# Patient Record
Sex: FEMALE | Race: OTHER | HISPANIC OR LATINO | URBAN - METROPOLITAN AREA
[De-identification: names, ages, dates, MRNs, and addresses within clinical notes are randomized per-mention and may not be internally consistent; named-entity substitution may affect disease eponyms.]

---

## 2020-06-06 ENCOUNTER — INPATIENT (INPATIENT)
Facility: HOSPITAL | Age: 20
LOS: 0 days | Discharge: HOME | End: 2020-06-07
Attending: SPECIALIST | Admitting: SPECIALIST

## 2020-06-06 VITALS — TEMPERATURE: 98 F

## 2020-06-06 DIAGNOSIS — Z3A.00 WEEKS OF GESTATION OF PREGNANCY NOT SPECIFIED: ICD-10-CM

## 2020-06-06 DIAGNOSIS — O26.899 OTHER SPECIFIED PREGNANCY RELATED CONDITIONS, UNSPECIFIED TRIMESTER: ICD-10-CM

## 2020-06-06 LAB
AMPHET UR-MCNC: NEGATIVE — SIGNIFICANT CHANGE UP
BARBITURATES UR SCN-MCNC: NEGATIVE — SIGNIFICANT CHANGE UP
BASOPHILS # BLD AUTO: 0.02 K/UL — SIGNIFICANT CHANGE UP (ref 0–0.2)
BASOPHILS NFR BLD AUTO: 0.2 % — SIGNIFICANT CHANGE UP (ref 0–2)
BENZODIAZ UR-MCNC: NEGATIVE — SIGNIFICANT CHANGE UP
BLD GP AB SCN SERPL QL: NEGATIVE — SIGNIFICANT CHANGE UP
CANNABINOIDS UR-MCNC: NEGATIVE — SIGNIFICANT CHANGE UP
COCAINE METAB.OTHER UR-MCNC: NEGATIVE — SIGNIFICANT CHANGE UP
EOSINOPHIL # BLD AUTO: 0.05 K/UL — SIGNIFICANT CHANGE UP (ref 0–0.5)
EOSINOPHIL NFR BLD AUTO: 0.6 % — SIGNIFICANT CHANGE UP (ref 0–6)
HBV SURFACE AG SER-ACNC: NEGATIVE — SIGNIFICANT CHANGE UP
HCT VFR BLD CALC: 31.4 % — LOW (ref 34.5–45)
HGB BLD-MCNC: 9.9 G/DL — LOW (ref 11.5–15.5)
HIV COMBO RESULT: SIGNIFICANT CHANGE UP
HIV1+2 AB SPEC QL: SIGNIFICANT CHANGE UP
IMM GRANULOCYTES NFR BLD AUTO: 0.9 % — SIGNIFICANT CHANGE UP (ref 0–1.5)
LYMPHOCYTES # BLD AUTO: 1.52 K/UL — SIGNIFICANT CHANGE UP (ref 1–3.3)
LYMPHOCYTES # BLD AUTO: 16.9 % — SIGNIFICANT CHANGE UP (ref 13–44)
MCHC RBC-ENTMCNC: 27 PG — SIGNIFICANT CHANGE UP (ref 27–34)
MCHC RBC-ENTMCNC: 31.5 % — LOW (ref 32–36)
MCV RBC AUTO: 85.8 FL — SIGNIFICANT CHANGE UP (ref 80–100)
METHADONE UR-MCNC: NEGATIVE — SIGNIFICANT CHANGE UP
MONOCYTES # BLD AUTO: 0.58 K/UL — SIGNIFICANT CHANGE UP (ref 0–0.9)
MONOCYTES NFR BLD AUTO: 6.5 % — SIGNIFICANT CHANGE UP (ref 2–14)
NEUTROPHILS # BLD AUTO: 6.74 K/UL — SIGNIFICANT CHANGE UP (ref 1.8–7.4)
NEUTROPHILS NFR BLD AUTO: 74.9 % — SIGNIFICANT CHANGE UP (ref 43–77)
NRBC # FLD: 0 K/UL — SIGNIFICANT CHANGE UP (ref 0–0)
OPIATES UR-MCNC: NEGATIVE — SIGNIFICANT CHANGE UP
OXYCODONE UR-MCNC: NEGATIVE — SIGNIFICANT CHANGE UP
PCP UR-MCNC: NEGATIVE — SIGNIFICANT CHANGE UP
PLATELET # BLD AUTO: 215 K/UL — SIGNIFICANT CHANGE UP (ref 150–400)
PMV BLD: 11.7 FL — SIGNIFICANT CHANGE UP (ref 7–13)
RBC # BLD: 3.66 M/UL — LOW (ref 3.8–5.2)
RBC # FLD: 15.7 % — HIGH (ref 10.3–14.5)
RH IG SCN BLD-IMP: POSITIVE — SIGNIFICANT CHANGE UP
RH IG SCN BLD-IMP: POSITIVE — SIGNIFICANT CHANGE UP
RUBV IGG SER-ACNC: 1.5 INDEX — SIGNIFICANT CHANGE UP
RUBV IGG SER-IMP: POSITIVE — SIGNIFICANT CHANGE UP
SARS-COV-2 RNA SPEC QL NAA+PROBE: SIGNIFICANT CHANGE UP
T PALLIDUM AB TITR SER: NEGATIVE — SIGNIFICANT CHANGE UP
WBC # BLD: 8.99 K/UL — SIGNIFICANT CHANGE UP (ref 3.8–10.5)
WBC # FLD AUTO: 8.99 K/UL — SIGNIFICANT CHANGE UP (ref 3.8–10.5)

## 2020-06-06 RX ORDER — OXYTOCIN 10 UNIT/ML
1 VIAL (ML) INJECTION
Qty: 30 | Refills: 0 | Status: DISCONTINUED | OUTPATIENT
Start: 2020-06-06 | End: 2020-06-07

## 2020-06-06 RX ORDER — MAGNESIUM HYDROXIDE 400 MG/1
30 TABLET, CHEWABLE ORAL
Refills: 0 | Status: DISCONTINUED | OUTPATIENT
Start: 2020-06-06 | End: 2020-06-07

## 2020-06-06 RX ORDER — SODIUM CHLORIDE 9 MG/ML
3 INJECTION INTRAMUSCULAR; INTRAVENOUS; SUBCUTANEOUS EVERY 8 HOURS
Refills: 0 | Status: DISCONTINUED | OUTPATIENT
Start: 2020-06-06 | End: 2020-06-07

## 2020-06-06 RX ORDER — IBUPROFEN 200 MG
600 TABLET ORAL EVERY 6 HOURS
Refills: 0 | Status: COMPLETED | OUTPATIENT
Start: 2020-06-06 | End: 2021-05-05

## 2020-06-06 RX ORDER — DIPHENHYDRAMINE HCL 50 MG
25 CAPSULE ORAL EVERY 6 HOURS
Refills: 0 | Status: DISCONTINUED | OUTPATIENT
Start: 2020-06-06 | End: 2020-06-07

## 2020-06-06 RX ORDER — ACETAMINOPHEN 500 MG
975 TABLET ORAL
Refills: 0 | Status: DISCONTINUED | OUTPATIENT
Start: 2020-06-06 | End: 2020-06-07

## 2020-06-06 RX ORDER — PRAMOXINE HYDROCHLORIDE 150 MG/15G
1 AEROSOL, FOAM RECTAL EVERY 4 HOURS
Refills: 0 | Status: DISCONTINUED | OUTPATIENT
Start: 2020-06-06 | End: 2020-06-07

## 2020-06-06 RX ORDER — OXYCODONE HYDROCHLORIDE 5 MG/1
5 TABLET ORAL
Refills: 0 | Status: DISCONTINUED | OUTPATIENT
Start: 2020-06-06 | End: 2020-06-07

## 2020-06-06 RX ORDER — SIMETHICONE 80 MG/1
80 TABLET, CHEWABLE ORAL EVERY 4 HOURS
Refills: 0 | Status: DISCONTINUED | OUTPATIENT
Start: 2020-06-06 | End: 2020-06-07

## 2020-06-06 RX ORDER — BENZOCAINE 10 %
1 GEL (GRAM) MUCOUS MEMBRANE EVERY 6 HOURS
Refills: 0 | Status: DISCONTINUED | OUTPATIENT
Start: 2020-06-06 | End: 2020-06-07

## 2020-06-06 RX ORDER — OXYCODONE HYDROCHLORIDE 5 MG/1
5 TABLET ORAL ONCE
Refills: 0 | Status: DISCONTINUED | OUTPATIENT
Start: 2020-06-06 | End: 2020-06-07

## 2020-06-06 RX ORDER — SODIUM CHLORIDE 9 MG/ML
1000 INJECTION, SOLUTION INTRAVENOUS
Refills: 0 | Status: DISCONTINUED | OUTPATIENT
Start: 2020-06-06 | End: 2020-06-06

## 2020-06-06 RX ORDER — AER TRAVELER 0.5 G/1
1 SOLUTION RECTAL; TOPICAL EVERY 4 HOURS
Refills: 0 | Status: DISCONTINUED | OUTPATIENT
Start: 2020-06-06 | End: 2020-06-07

## 2020-06-06 RX ORDER — KETOROLAC TROMETHAMINE 30 MG/ML
30 SYRINGE (ML) INJECTION ONCE
Refills: 0 | Status: DISCONTINUED | OUTPATIENT
Start: 2020-06-06 | End: 2020-06-06

## 2020-06-06 RX ORDER — HYDROCORTISONE 1 %
1 OINTMENT (GRAM) TOPICAL EVERY 6 HOURS
Refills: 0 | Status: DISCONTINUED | OUTPATIENT
Start: 2020-06-06 | End: 2020-06-07

## 2020-06-06 RX ORDER — OXYTOCIN 10 UNIT/ML
333.33 VIAL (ML) INJECTION
Qty: 20 | Refills: 0 | Status: DISCONTINUED | OUTPATIENT
Start: 2020-06-06 | End: 2020-06-06

## 2020-06-06 RX ORDER — OXYTOCIN 10 UNIT/ML
333.33 VIAL (ML) INJECTION
Qty: 20 | Refills: 0 | Status: DISCONTINUED | OUTPATIENT
Start: 2020-06-06 | End: 2020-06-07

## 2020-06-06 RX ORDER — CITRIC ACID/SODIUM CITRATE 300-500 MG
15 SOLUTION, ORAL ORAL EVERY 6 HOURS
Refills: 0 | Status: DISCONTINUED | OUTPATIENT
Start: 2020-06-06 | End: 2020-06-06

## 2020-06-06 RX ORDER — IBUPROFEN 200 MG
600 TABLET ORAL EVERY 6 HOURS
Refills: 0 | Status: DISCONTINUED | OUTPATIENT
Start: 2020-06-06 | End: 2020-06-07

## 2020-06-06 RX ORDER — LANOLIN
1 OINTMENT (GRAM) TOPICAL EVERY 6 HOURS
Refills: 0 | Status: DISCONTINUED | OUTPATIENT
Start: 2020-06-06 | End: 2020-06-07

## 2020-06-06 RX ORDER — DIBUCAINE 1 %
1 OINTMENT (GRAM) RECTAL EVERY 6 HOURS
Refills: 0 | Status: DISCONTINUED | OUTPATIENT
Start: 2020-06-06 | End: 2020-06-07

## 2020-06-06 RX ORDER — TETANUS TOXOID, REDUCED DIPHTHERIA TOXOID AND ACELLULAR PERTUSSIS VACCINE, ADSORBED 5; 2.5; 8; 8; 2.5 [IU]/.5ML; [IU]/.5ML; UG/.5ML; UG/.5ML; UG/.5ML
0.5 SUSPENSION INTRAMUSCULAR ONCE
Refills: 0 | Status: DISCONTINUED | OUTPATIENT
Start: 2020-06-06 | End: 2020-06-07

## 2020-06-06 RX ORDER — OXYTOCIN 10 UNIT/ML
2 VIAL (ML) INJECTION
Qty: 30 | Refills: 0 | Status: DISCONTINUED | OUTPATIENT
Start: 2020-06-06 | End: 2020-06-06

## 2020-06-06 RX ADMIN — Medication 1 MILLIUNIT(S)/MIN: at 09:00

## 2020-06-06 RX ADMIN — Medication 600 MILLIGRAM(S): at 22:25

## 2020-06-06 RX ADMIN — SODIUM CHLORIDE 125 MILLILITER(S): 9 INJECTION, SOLUTION INTRAVENOUS at 09:01

## 2020-06-06 RX ADMIN — SODIUM CHLORIDE 3 MILLILITER(S): 9 INJECTION INTRAMUSCULAR; INTRAVENOUS; SUBCUTANEOUS at 22:25

## 2020-06-06 NOTE — OB PROVIDER TRIAGE NOTE - HISTORY OF PRESENT ILLNESS
19 y.o.   at 41.1w presenting with complaints of painful contractions since 0300 today.  Patient reports contractions q2-3 minutes 8/10 pain.  Patient denies any need for pain intervention at this time.  Patient reports positive fetal movement, denies vaginal bleeding, denies leakage of fluid.     a/p course complications denies  patient was expected to [facility] today for Induction of Labor for Post Dates

## 2020-06-06 NOTE — OB PROVIDER TRIAGE NOTE - NSHPPHYSICALEXAM_GEN_ALL_CORE
Vital Signs Last 24 Hrs  T(C): 36.8 (06 Jun 2020 06:03), Max: 36.8 (06 Jun 2020 06:03)  T(F): 98.2 (06 Jun 2020 06:03), Max: 98.2 (06 Jun 2020 06:03)  HR: 73 (06 Jun 2020 06:03) (73 - 73)  BP: 104/62 (06 Jun 2020 06:03) (104/62 - 104/62)  BP(mean): --  RR: 17 (06 Jun 2020 06:03) (17 - 17)  SpO2: --    regular heart rate; rhythm   lungs CTA     abdomen soft nontender gravid  (+) FHR; moderate variability; with accelerations  irregular uterine contractions noted on tocometer    Vaginal Exam: 5/70/-3    Cephalic via admission sonogram

## 2020-06-06 NOTE — OB RN TRIAGE NOTE - ABORTIONS, OB PROFILE
No Pcp    Last eye exam - June 2019    Pt checked bs at home with a reading of 78.    Rooming documentation of social history includes tobacco screening only.       0

## 2020-06-06 NOTE — CHART NOTE - NSCHARTNOTEFT_GEN_A_CORE
R1 OB Progress Note    Patient seen and evaluated at bedside.       T(C): 37 (06-06-20 @ 07:32), Max: 37 (06-06-20 @ 07:32)  HR: 88 (06-06-20 @ 11:40) (54 - 96)  BP: 100/49 (06-06-20 @ 11:39) (86/48 - 158/70)  RR: 16 (06-06-20 @ 07:32) (16 - 17)  SpO2: 100% (06-06-20 @ 11:40) (88% - 100%)    SVE: 9/90/-2  EFM: 125, mod eliu, - accels, recurrent variable decels NAN   [place]:  irregular    A/P 19y P1  admitted for labor  -Labor: CATII tracing. No augmentation at this time  -Fetus: category 2 tracing due variable decels.  Overall reassuring w/ moderate variability.   Continue lateral tilt, O2 as needed for intrauterine resucitation. Ephedrine given after epidural with low BPs. Fluid bolus given  -GBS unk  -Analgesia: epi effective     Bonilla Corea PGY1  d/w

## 2020-06-06 NOTE — OB PROVIDER DELIVERY SUMMARY - NSPROVIDERDELIVERYNOTE_OBGYN_ALL_OB_FT
Spontaneous vaginal delivery of liveborn infant. Head, shoulders, and body delivered easily. Infant was suctioned. Heavy mec. Cord was clamped and cut and infant was passed to peds. Placenta delivered intact with a 3 vessel cord. Fundal massage was given and uterine fundus was found to be firm. Vaginal exam revealed an intact cervix, vaginal walls and sulci, and perineum. Patient was stable and went to recovery. Count was correct x 2. Spontaneous vaginal delivery of liveborn infant. Head delivered, Nuchal x 1 reduced without difficulty followed by shoulders, and body. Infant was suctioned. Heavy mec. Cord was clamped and cut and infant was passed to peds. Placenta delivered intact with a 3 vessel cord. Fundal massage was given and uterine fundus was found to be firm. Vaginal exam revealed an intact cervix, vaginal walls and sulci, and perineum. Patient was stable and went to recovery. Count was correct x 2.

## 2020-06-06 NOTE — OB PROVIDER H&P - HISTORY OF PRESENT ILLNESS
19 y.o.   at 41.1w presenting with complaints of painful contractions since 0300 today.  Patient reports contractions q2-3 minutes 8/10 pain.  Patient denies any need for pain intervention at this time.  Patient reports positive fetal movement, denies vaginal bleeding, denies leakage of fluid.     a/p course complications denies  patient was expected to [facility] today for Induction of Labor for Post Dates

## 2020-06-06 NOTE — OB RN TRIAGE NOTE - CHIEF COMPLAINT QUOTE
"Contractions from 3.30, every 5 minutes now, supposed to be scheduled induction today evening at [place] for post dates"

## 2020-06-06 NOTE — CHART NOTE - NSCHARTNOTEFT_GEN_A_CORE
R1 OB Progress Note    Patient seen and evaluated at bedside.       T(C): 37 (06-06-20 @ 07:32), Max: 37 (06-06-20 @ 07:32)  HR: 77 (06-06-20 @ 10:24) (63 - 89)  BP: 105/53 (06-06-20 @ 07:59) (104/62 - 111/65)  RR: 16 (06-06-20 @ 07:32) (16 - 17)  SpO2: 100% (06-06-20 @ 10:29) (88% - 100%)    SVE: 8/90/-3  EFM: 140, mod eliu, + accels, + Variable decels CATII  [place]:  irregular    A/P 19y P1  admitted for labor  -Labor: currently on 1uPit. Will hold  -Fetus: category 2 tracing due to intermittent variables.  Overall reassuring w/ moderate variability.   Continue lateral tilt, O2 as needed for intrauterine resucitation.    -GBS unk  -Analgesia: pt declines epidural     Bonilla Corea PGY1   bedside

## 2020-06-06 NOTE — OB RN DELIVERY SUMMARY - NS_SEPSISRSKCALC_OBGYN_ALL_OB_FT
EOS calculated successfully. EOS Risk Factor: 0.5/1000 live births ([facility] national incidence); GA=41w1d; Temp=98.96; ROM=2.933; GBS='Unknown'; Antibiotics='No antibiotics or any antibiotics < 2 hrs prior to birth'

## 2020-06-06 NOTE — OB NEONATOLOGY/PEDIATRICIAN DELIVERY SUMMARY - NSPEDSNEONOTESA_OBGYN_ALL_OB_FT
Baby girl born at 41.1 wks via precipitous  to a 18 y/o  O+ blood type mother. No significant maternal or prenatal history known. HepB nonreactive and HIV negative. RPR and Rubella pending. COVID pending. AROM at 1045 with thick meconium fluids (ROM duration 4 hours). Baby emerged vigorous, crying, was w/d/s/s with APGARS of 8/9. Mom would like to breast and bottle feed, consents Hep. EOS 0.13 (highest maternal temp 37degC). NICU NP Conrado Will present at delivery.

## 2020-06-06 NOTE — CHART NOTE - NSCHARTNOTEFT_GEN_A_CORE
R1 OB Progress Note    [facility] Interpretor #[id number]  Patient seen and evaluated at bedside.  Denies complaints.  Currently does not want an epidural    T(C): 37 (06-06-20 @ 07:32), Max: 37 (06-06-20 @ 07:32)  HR: 76 (06-06-20 @ 08:34) (64 - 89)  BP: 105/53 (06-06-20 @ 07:59) (104/62 - 111/65)  RR: 16 (06-06-20 @ 07:32) (16 - 17)  SpO2: 100% (06-06-20 @ 08:39) (91% - 100%)    SVE: 5.5/80/-3  EFM: 145, mod eliu, + accels, - decels NAN   [place]:  q2-4m    A/P 19y P1   admitted for labor  -Labor: Will start pitocin at this time  -Fetus: NAN  -GBS unk  -Analgesia: declines epidural at this time    Bonilla Corea PGY1  d/w

## 2020-06-06 NOTE — OB PROVIDER H&P - ASSESSMENT
pmh: denies  psh: denies  obhx: -FT- ; 8lbs  gynhx: denies    NKDA    Medications  PNV    Assessment  Vital Signs Last 24 Hrs  T(C): 36.8 (2020 06:03), Max: 36.8 (2020 06:03)  T(F): 98.2 (2020 06:03), Max: 98.2 (2020 06:03)  HR: 73 (2020 06:03) (73 - 73)  BP: 104/62 (2020 06:03) (104/62 - 104/62)  BP(mean): --  RR: 17 (2020 06:03) (17 - 17)  SpO2: --    regular heart rate; rhythm   lungs CTA     abdomen soft nontender gravid  (+) FHR; moderate variability; with accelerations  irregular uterine contractions noted on tocometer    Vaginal Exam: 5/70/-3    Cephalic via admission sonogram    GBS (-) per patient     COVID 19 assessment; patient denies signs and symptoms related to COVID 19  reports recent testing; resulted as negative     Plan  Admit to Labor and Delivery for Labor at Term  Routine Admissions Labs   Labs  COVID 19 Swam Pending  Expectant Management    D/w Dr. Reed

## 2020-06-07 VITALS
SYSTOLIC BLOOD PRESSURE: 91 MMHG | TEMPERATURE: 98 F | OXYGEN SATURATION: 96 % | RESPIRATION RATE: 16 BRPM | HEART RATE: 65 BPM | DIASTOLIC BLOOD PRESSURE: 50 MMHG

## 2020-06-07 RX ORDER — ACETAMINOPHEN 500 MG
3 TABLET ORAL
Qty: 0 | Refills: 0 | DISCHARGE
Start: 2020-06-07

## 2020-06-07 RX ORDER — MEDROXYPROGESTERONE ACETATE 150 MG/ML
150 INJECTION, SUSPENSION, EXTENDED RELEASE INTRAMUSCULAR ONCE
Refills: 0 | Status: COMPLETED | OUTPATIENT
Start: 2020-06-07 | End: 2020-06-07

## 2020-06-07 RX ORDER — IBUPROFEN 200 MG
1 TABLET ORAL
Qty: 0 | Refills: 0 | DISCHARGE
Start: 2020-06-07

## 2020-06-07 RX ADMIN — MEDROXYPROGESTERONE ACETATE 150 MILLIGRAM(S): 150 INJECTION, SUSPENSION, EXTENDED RELEASE INTRAMUSCULAR at 15:58

## 2020-06-07 RX ADMIN — Medication 600 MILLIGRAM(S): at 03:32

## 2020-06-07 RX ADMIN — SODIUM CHLORIDE 3 MILLILITER(S): 9 INJECTION INTRAMUSCULAR; INTRAVENOUS; SUBCUTANEOUS at 05:34

## 2020-06-07 RX ADMIN — Medication 600 MILLIGRAM(S): at 15:25

## 2020-06-07 RX ADMIN — Medication 975 MILLIGRAM(S): at 09:59

## 2020-06-07 NOTE — DISCHARGE NOTE OB - PROVIDER TOKENS
FREE:[LAST:[[facility]],PHONE:[phone]],FAX:[(   )    -],FOLLOWUP:[Routine]]

## 2020-06-07 NOTE — DISCHARGE NOTE OB - CARE PROVIDER_API CALL
[facility],   Phone: [phone]  Fax: (   )    -  Follow Up Time: Routine

## 2020-06-07 NOTE — DISCHARGE NOTE OB - CARE PLAN
Principal Discharge DX:	 (normal spontaneous vaginal delivery)  Goal:	Recover  Assessment and plan of treatment:	After discharge, please stay on pelvic rest for 6 weeks, meaning no sexual intercourse, no tampons and no douching.  No driving for 2 weeks as women can loose a lot of blood during delivery and there is a possibility of being lightheaded/fainting.  No lifting objects heavier than baby for two weeks.  Expect to have vaginal bleeding/spotting for up to six weeks.  The bleeding should get lighter and more white/light brown with time.  For bleeding soaking more than a pad an hour or passing clots greater than the size of your fist, come in to the emergency department.    Follow up in clinic in 6 weeks.

## 2020-06-07 NOTE — DISCHARGE NOTE OB - MEDICATION SUMMARY - MEDICATIONS TO TAKE
I will START or STAY ON the medications listed below when I get home from the hospital:    acetaminophen 325 mg oral tablet  -- 3 tab(s) by mouth   -- Indication: For  (normal spontaneous vaginal delivery)    ibuprofen 600 mg oral tablet  -- 1 tab(s) by mouth every 6 hours  -- Indication: For  (normal spontaneous vaginal delivery)

## 2020-06-07 NOTE — PROGRESS NOTE ADULT - PROBLEM SELECTOR PLAN 1
-Depo->nexplanon   - Pain well controlled, continue current pain regimen  - Increase ambulation, SCDs when not ambulating  - Continue regular diet  - Would like to follow up in PCAP    Randolph Sofia PGY1

## 2020-06-07 NOTE — DISCHARGE NOTE OB - PLAN OF CARE
Recover After discharge, please stay on pelvic rest for 6 weeks, meaning no sexual intercourse, no tampons and no douching.  No driving for 2 weeks as women can loose a lot of blood during delivery and there is a possibility of being lightheaded/fainting.  No lifting objects heavier than baby for two weeks.  Expect to have vaginal bleeding/spotting for up to six weeks.  The bleeding should get lighter and more white/light brown with time.  For bleeding soaking more than a pad an hour or passing clots greater than the size of your fist, come in to the emergency department.    Follow up in clinic in 6 weeks.

## 2020-06-07 NOTE — DISCHARGE NOTE OB - HOSPITAL COURSE
Patient had uncomplicated, nonsurgical vaginal delivery.  Please see delivery note for details.  During postpartum course patient's vitals were stable, vaginal bleeding appropriate, and pain well controlled.  On day of discharge patient was ambulating, her pain controlled with oral medications, had adequate oral intake, and was voiding freely.  Discharge instructions and precautions were given.  Will return to clinic in 6 weeks for postpartum visit.  Postpartum birth control plan is depot.

## 2020-06-07 NOTE — PROGRESS NOTE ADULT - ATTENDING COMMENTS
Pt seen agree with above resident note.  PPD#1 no complaints.  VSS, afebrile.  Will dc home today.     Alma Nieto MD

## 2020-06-07 NOTE — PROGRESS NOTE ADULT - SUBJECTIVE AND OBJECTIVE BOX
OB Progress Note:  PPD#1    S: 18yo  PPD#1 s/p . Patient feels well. Pain is well controlled. She is tolerating a regular diet and passing flatus. She is voiding spontaneously, and ambulating without difficulty. Denies CP/SOB. Denies lightheadedness/dizziness. Denies N/V. Denies heavy vaginal bleeding.    O:  Vitals:  Vital Signs Last 24 Hrs  T(C): 36.6 (2020 01:21), Max: 37.2 (2020 13:34)  T(F): 97.9 (2020 01:21), Max: 99 (2020 13:45)  HR: 71 (:21) (54 - 130)  BP: 98/51 (2020 01:) (82/45 - 158/70)  BP(mean): --  RR: 16 (2020 01:21) (15 - 17)  SpO2: 97% (2020 01:) (88% - 100%)    MEDICATIONS  (STANDING):  acetaminophen   Tablet .. 975 milliGRAM(s) Oral <User Schedule>  diphtheria/tetanus/pertussis (acellular) Vaccine (ADAcel) 0.5 milliLiter(s) IntraMuscular once  ibuprofen  Tablet. 600 milliGRAM(s) Oral every 6 hours  oxytocin Infusion 333.333 milliUNIT(s)/Min (1000 mL/Hr) IV Continuous <Continuous>  oxytocin Infusion 1 milliUNIT(s)/Min (1 mL/Hr) IV Continuous <Continuous>  prenatal multivitamin 1 Tablet(s) Oral daily  sodium chloride 0.9% lock flush 3 milliLiter(s) IV Push every 8 hours      Labs:  Blood type: O Positive  Rubella IgG: Positive ( @ 06:35)  RPR: Negative                          9.9<L>   8.99 >-----------< 215    (  @ 06:35 )             31.4<L>    Physical Exam:  General: NAD  Abdomen: soft, non-tender, non-distended, fundus firm  Vaginal: No heavy vaginal bleeding  Extremities: No erythema/edema    A/P: 18yo PPD#1 s/p .  Patient is stable and doing well post-partum.   -Depo->nexplanon   - Pain well controlled, continue current pain regimen  - Increase ambulation, SCDs when not ambulating  - Continue regular diet  - Would like to follow up in PCAP    Randolph Sofia PGY1 OB Progress Note:  PPD#1    S: 18yo  PPD#1 s/p . Patient feels well. Pain is well controlled. She is tolerating a regular diet and passing flatus. She is voiding spontaneously, and ambulating without difficulty. Denies CP/SOB. Denies lightheadedness/dizziness. Denies N/V. Denies heavy vaginal bleeding.    O:  Vitals:  Vital Signs Last 24 Hrs  T(C): 36.6 (2020 01:21), Max: 37.2 (2020 13:34)  T(F): 97.9 (2020 01:21), Max: 99 (2020 13:45)  HR: 71 (:) (54 - 130)  BP: 98/51 (:) (82/45 - 158/70)  BP(mean): --  RR: 16 (2020 01:21) (15 - 17)  SpO2: 97% (:) (88% - 100%)    MEDICATIONS  (STANDING):  acetaminophen   Tablet .. 975 milliGRAM(s) Oral <User Schedule>  diphtheria/tetanus/pertussis (acellular) Vaccine (ADAcel) 0.5 milliLiter(s) IntraMuscular once  ibuprofen  Tablet. 600 milliGRAM(s) Oral every 6 hours  oxytocin Infusion 333.333 milliUNIT(s)/Min (1000 mL/Hr) IV Continuous <Continuous>  oxytocin Infusion 1 milliUNIT(s)/Min (1 mL/Hr) IV Continuous <Continuous>  prenatal multivitamin 1 Tablet(s) Oral daily  sodium chloride 0.9% lock flush 3 milliLiter(s) IV Push every 8 hours      Labs:  Blood type: O Positive  Rubella IgG: Positive ( @ 06:35)  RPR: Negative                          9.9<L>   8.99 >-----------< 215    (  @ 06:35 )             31.4<L>    Physical Exam:  General: NAD  Abdomen: soft, non-tender, non-distended, fundus firm  Vaginal: No heavy vaginal bleeding  Extremities: No erythema/edema

## 2021-08-16 ENCOUNTER — EMERGENCY (EMERGENCY)
Facility: HOSPITAL | Age: 21
LOS: 1 days | Discharge: HOME | End: 2021-08-16
Attending: HOSPITALIST | Admitting: HOSPITALIST
Payer: MEDICAID

## 2021-08-16 VITALS
DIASTOLIC BLOOD PRESSURE: 66 MMHG | HEIGHT: 60 IN | TEMPERATURE: 98 F | HEART RATE: 84 BPM | OXYGEN SATURATION: 100 % | SYSTOLIC BLOOD PRESSURE: 107 MMHG | RESPIRATION RATE: 19 BRPM

## 2021-08-16 LAB
ALBUMIN SERPL ELPH-MCNC: 4.3 G/DL — SIGNIFICANT CHANGE UP (ref 3.3–5)
ALP SERPL-CCNC: 85 U/L — SIGNIFICANT CHANGE UP (ref 40–120)
ALT FLD-CCNC: 14 U/L — SIGNIFICANT CHANGE UP (ref 4–33)
ANION GAP SERPL CALC-SCNC: 14 MMOL/L — SIGNIFICANT CHANGE UP (ref 7–14)
APPEARANCE UR: CLEAR — SIGNIFICANT CHANGE UP
AST SERPL-CCNC: 13 U/L — SIGNIFICANT CHANGE UP (ref 4–32)
BACTERIA # UR AUTO: NEGATIVE — SIGNIFICANT CHANGE UP
BASOPHILS # BLD AUTO: 0.01 K/UL — SIGNIFICANT CHANGE UP (ref 0–0.2)
BASOPHILS NFR BLD AUTO: 0.2 % — SIGNIFICANT CHANGE UP (ref 0–2)
BILIRUB SERPL-MCNC: 0.7 MG/DL — SIGNIFICANT CHANGE UP (ref 0.2–1.2)
BILIRUB UR-MCNC: NEGATIVE — SIGNIFICANT CHANGE UP
BUN SERPL-MCNC: 15 MG/DL — SIGNIFICANT CHANGE UP (ref 7–23)
CALCIUM SERPL-MCNC: 8.8 MG/DL — SIGNIFICANT CHANGE UP (ref 8.4–10.5)
CHLORIDE SERPL-SCNC: 106 MMOL/L — SIGNIFICANT CHANGE UP (ref 98–107)
CO2 SERPL-SCNC: 20 MMOL/L — LOW (ref 22–31)
COLOR SPEC: YELLOW — SIGNIFICANT CHANGE UP
CREAT SERPL-MCNC: 0.49 MG/DL — LOW (ref 0.5–1.3)
DIFF PNL FLD: NEGATIVE — SIGNIFICANT CHANGE UP
EOSINOPHIL # BLD AUTO: 0.03 K/UL — SIGNIFICANT CHANGE UP (ref 0–0.5)
EOSINOPHIL NFR BLD AUTO: 0.5 % — SIGNIFICANT CHANGE UP (ref 0–6)
EPI CELLS # UR: 9 /HPF — HIGH (ref 0–5)
GLUCOSE SERPL-MCNC: 89 MG/DL — SIGNIFICANT CHANGE UP (ref 70–99)
GLUCOSE UR QL: NEGATIVE — SIGNIFICANT CHANGE UP
HCT VFR BLD CALC: 39 % — SIGNIFICANT CHANGE UP (ref 34.5–45)
HGB BLD-MCNC: 12.9 G/DL — SIGNIFICANT CHANGE UP (ref 11.5–15.5)
HYALINE CASTS # UR AUTO: 3 /LPF — SIGNIFICANT CHANGE UP (ref 0–7)
IANC: 5.21 K/UL — SIGNIFICANT CHANGE UP (ref 1.5–8.5)
IMM GRANULOCYTES NFR BLD AUTO: 0.3 % — SIGNIFICANT CHANGE UP (ref 0–1.5)
KETONES UR-MCNC: ABNORMAL
LEUKOCYTE ESTERASE UR-ACNC: NEGATIVE — SIGNIFICANT CHANGE UP
LIDOCAIN IGE QN: 19 U/L — SIGNIFICANT CHANGE UP (ref 7–60)
LYMPHOCYTES # BLD AUTO: 0.56 K/UL — LOW (ref 1–3.3)
LYMPHOCYTES # BLD AUTO: 8.9 % — LOW (ref 13–44)
MCHC RBC-ENTMCNC: 31 PG — SIGNIFICANT CHANGE UP (ref 27–34)
MCHC RBC-ENTMCNC: 33.1 GM/DL — SIGNIFICANT CHANGE UP (ref 32–36)
MCV RBC AUTO: 93.8 FL — SIGNIFICANT CHANGE UP (ref 80–100)
MONOCYTES # BLD AUTO: 0.48 K/UL — SIGNIFICANT CHANGE UP (ref 0–0.9)
MONOCYTES NFR BLD AUTO: 7.6 % — SIGNIFICANT CHANGE UP (ref 2–14)
NEUTROPHILS # BLD AUTO: 5.21 K/UL — SIGNIFICANT CHANGE UP (ref 1.8–7.4)
NEUTROPHILS NFR BLD AUTO: 82.5 % — HIGH (ref 43–77)
NITRITE UR-MCNC: NEGATIVE — SIGNIFICANT CHANGE UP
NRBC # BLD: 0 /100 WBCS — SIGNIFICANT CHANGE UP
NRBC # FLD: 0 K/UL — SIGNIFICANT CHANGE UP
PH UR: 6 — SIGNIFICANT CHANGE UP (ref 5–8)
PLATELET # BLD AUTO: 169 K/UL — SIGNIFICANT CHANGE UP (ref 150–400)
POTASSIUM SERPL-MCNC: 3.5 MMOL/L — SIGNIFICANT CHANGE UP (ref 3.5–5.3)
POTASSIUM SERPL-SCNC: 3.5 MMOL/L — SIGNIFICANT CHANGE UP (ref 3.5–5.3)
PROT SERPL-MCNC: 7.1 G/DL — SIGNIFICANT CHANGE UP (ref 6–8.3)
PROT UR-MCNC: ABNORMAL
RBC # BLD: 4.16 M/UL — SIGNIFICANT CHANGE UP (ref 3.8–5.2)
RBC # FLD: 12.1 % — SIGNIFICANT CHANGE UP (ref 10.3–14.5)
RBC CASTS # UR COMP ASSIST: 1 /HPF — SIGNIFICANT CHANGE UP (ref 0–4)
SODIUM SERPL-SCNC: 140 MMOL/L — SIGNIFICANT CHANGE UP (ref 135–145)
SP GR SPEC: 1.04 — HIGH (ref 1.01–1.02)
UROBILINOGEN FLD QL: SIGNIFICANT CHANGE UP
WBC # BLD: 6.31 K/UL — SIGNIFICANT CHANGE UP (ref 3.8–10.5)
WBC # FLD AUTO: 6.31 K/UL — SIGNIFICANT CHANGE UP (ref 3.8–10.5)
WBC UR QL: 3 /HPF — SIGNIFICANT CHANGE UP (ref 0–5)

## 2021-08-16 PROCEDURE — 76705 ECHO EXAM OF ABDOMEN: CPT | Mod: 26

## 2021-08-16 PROCEDURE — 99285 EMERGENCY DEPT VISIT HI MDM: CPT

## 2021-08-16 RX ORDER — ACETAMINOPHEN 500 MG
650 TABLET ORAL ONCE
Refills: 0 | Status: COMPLETED | OUTPATIENT
Start: 2021-08-16 | End: 2021-08-16

## 2021-08-16 RX ORDER — ONDANSETRON 8 MG/1
4 TABLET, FILM COATED ORAL ONCE
Refills: 0 | Status: COMPLETED | OUTPATIENT
Start: 2021-08-16 | End: 2021-08-16

## 2021-08-16 RX ORDER — SODIUM CHLORIDE 9 MG/ML
1000 INJECTION INTRAMUSCULAR; INTRAVENOUS; SUBCUTANEOUS ONCE
Refills: 0 | Status: COMPLETED | OUTPATIENT
Start: 2021-08-16 | End: 2021-08-16

## 2021-08-16 RX ORDER — FAMOTIDINE 10 MG/ML
1 INJECTION INTRAVENOUS
Qty: 14 | Refills: 0
Start: 2021-08-16 | End: 2021-08-29

## 2021-08-16 RX ADMIN — ONDANSETRON 4 MILLIGRAM(S): 8 TABLET, FILM COATED ORAL at 07:44

## 2021-08-16 RX ADMIN — SODIUM CHLORIDE 1000 MILLILITER(S): 9 INJECTION INTRAMUSCULAR; INTRAVENOUS; SUBCUTANEOUS at 07:45

## 2021-08-16 RX ADMIN — Medication 650 MILLIGRAM(S): at 07:45

## 2021-08-16 NOTE — ED PROVIDER NOTE - OBJECTIVE STATEMENT
21yo F who is a  reporting to the ED with abdominal pain. She reports a 1-2 week history of intermittent RUQ abdominal pain. She reports the pain as intermittent, progressive. She reports the pain as 8/10, sharp. She reports her PCP diagnosed her with a gastric ulcer but she denies any history of EGD or other scope. She reports the pain worse with seven up last night. She denies being vaccinated for COVID-19. She denies any melena, hematochezia. She reports her LMP was last month.

## 2021-08-16 NOTE — ED ADULT TRIAGE NOTE - TEMPERATURE IN CELSIUS (DEGREES C)
Discharge Note:



LEFTY RIOS [facility]



Discharge instructions and discharge home medications reviewed with Patient and a copy 
given. All questions have been answered and understanding verbalized. 



The following instructions and handouts were given: Drug abuse, finding treatment



Discontinued lines and drains: IV catheter removed intact.



Patient discharged to Home with self care via transport done done 36.8

## 2021-08-16 NOTE — ED PROVIDER NOTE - PATIENT PORTAL LINK FT
You can access the FollowMyHealth Patient Portal offered by [facility] by registering at the following website: [email or web address]. By joining [employer]’s FollowMyHealth portal, you will also be able to view your health information using other applications (apps) compatible with our system.

## 2021-08-16 NOTE — ED PROVIDER NOTE - CLINICAL SUMMARY MEDICAL DECISION MAKING FREE TEXT BOX
21yo F who is a  reporting to the ED with abdominal pain. This patient was staffed with my supervising physician, Dr. Joya; medical record was reviewed. Professional Cambodian translation was used via [employer] . Upon arrival to ED, patient was promptly evaluated and was non-toxic. DDx consisted but was not limited to biliary dysfunction, UTI, pregnancy, pancreatitis. CBC with no evidence of leukocytosis. Lipase reassuring, no evidence of pancreatitis. UA w/ no evidence of UTI. Low suspicion for pyelonephritis. RUQ showed no evidence of hector-pancreatic fluid, no evidence of GWB thickening, and no evidence of CBG dilation. Patient received 1L IV fluids, tylenol, zofran.  I spoke with this patient about the results of her RUQ US - 5mm gallbaldder polyp. I discussed following up with primary care physician as well as GI physician and was able to work with the [facility] discharge lounge to ensure she has a GI followup. I discussed return precautions, the evolution of symptoms, and to report to her PCP (of which, I provided her with updated contact lists). All questions were answered and the patient was discharged in stable condition.

## 2021-08-16 NOTE — ED PROVIDER NOTE - NSFOLLOWUPINSTRUCTIONS_ED_ALL_ED_FT
Please follow up with PCP in 3-5 days as well as obtain an interval ultrasound in 6 months to assess stability of the gall baldder polyp. Please report back to ED with worsening pain, vomiting, or other worrisome symptoms.

## 2021-08-16 NOTE — ED ADULT NURSE NOTE - OBJECTIVE STATEMENT
Received patient to intake for epigastric pain since Saturday. A&o4 ambulatory, hx of gastroparesis, pt had various episodes of vomiting and diarrhea, abdomen soft and nondistended. RR even and unlabored.

## 2021-08-16 NOTE — ED ADULT TRIAGE NOTE - CHIEF COMPLAINT QUOTE
Pt c/o epigastric pain, n/v/d since Saturday after drinking sprite. PMHX Gastritis. Denies fever, chills, covid vaccine.

## 2021-08-16 NOTE — ED PROVIDER NOTE - ATTENDING CONTRIBUTION TO CARE
20F with hx of gastritis, p/w epigastric pain and vomiting since Saturday. states she didn't eat anything yesterday. no fevers, diarrhea.      ***GEN - NAD; well appearing; A+O x3 ***HEAD - NC/AT ***EYES/NOSE - PERRL, EOMI, mucous membranes moist, no discharge ***THROAT: Oral cavity and pharynx normal. No inflammation, swelling, exudate, or lesions.  ***NECK: Neck supple, non-tender without lymphadenopathy, no masses, no thyromegaly.   ***PULMONARY - CTA b/l, symmetric breath sounds. ***CARDIAC -s1s2, RRR, no M,G,R  ***ABDOMEN - +BS, ND, NT, soft, no guarding, no rebound, no masses   ***BACK - no CVA tenderness, Normal  spine ***EXTREMITIES - symmetric pulses, 2+ dp, capillary refill < 2 seconds, no clubbing, no cyanosis, no edema ***SKIN - no rash or bruising   ***NEUROLOGIC - alert, CN 2-12 intact    MDM: 20F with known gastritis, now with abdominal pain and vomiting. possible worsening gastritis, r/o acute cholecystitis, pancreatitis. labs, meds, ruq us, reassess.

## 2025-05-24 NOTE — DISCHARGE NOTE OB - PATIENT PORTAL LINK FT
No You can access the FollowMyHealth Patient Portal offered by [facility] by registering at the following website: [email or web address]. By joining [employer]’s FollowMyHealth portal, you will also be able to view your health information using other applications (apps) compatible with our system.